# Patient Record
Sex: FEMALE | Race: WHITE
[De-identification: names, ages, dates, MRNs, and addresses within clinical notes are randomized per-mention and may not be internally consistent; named-entity substitution may affect disease eponyms.]

---

## 2017-04-06 ENCOUNTER — HOSPITAL ENCOUNTER (EMERGENCY)
Dept: HOSPITAL 17 - NEPD | Age: 32
Discharge: HOME | End: 2017-04-06
Payer: MEDICAID

## 2017-04-06 VITALS
OXYGEN SATURATION: 98 % | RESPIRATION RATE: 16 BRPM | TEMPERATURE: 98.2 F | DIASTOLIC BLOOD PRESSURE: 58 MMHG | HEART RATE: 72 BPM | SYSTOLIC BLOOD PRESSURE: 108 MMHG

## 2017-04-06 VITALS — RESPIRATION RATE: 16 BRPM

## 2017-04-06 VITALS
RESPIRATION RATE: 18 BRPM | OXYGEN SATURATION: 100 % | SYSTOLIC BLOOD PRESSURE: 107 MMHG | HEART RATE: 64 BPM | DIASTOLIC BLOOD PRESSURE: 60 MMHG

## 2017-04-06 VITALS — HEIGHT: 64 IN | BODY MASS INDEX: 19.95 KG/M2 | WEIGHT: 116.84 LBS

## 2017-04-06 VITALS — OXYGEN SATURATION: 100 %

## 2017-04-06 DIAGNOSIS — K52.9: Primary | ICD-10-CM

## 2017-04-06 DIAGNOSIS — Z72.0: ICD-10-CM

## 2017-04-06 DIAGNOSIS — E87.6: ICD-10-CM

## 2017-04-06 LAB
ALP SERPL-CCNC: 79 U/L (ref 45–117)
ALT SERPL-CCNC: 28 U/L (ref 10–53)
ANION GAP SERPL CALC-SCNC: 6 MEQ/L (ref 5–15)
APTT BLD: 26.1 SEC (ref 24.3–30.1)
AST SERPL-CCNC: 15 U/L (ref 15–37)
BACTERIA #/AREA URNS HPF: (no result) /HPF
BASOPHILS # BLD AUTO: 0 TH/MM3 (ref 0–0.2)
BASOPHILS NFR BLD: 0.4 % (ref 0–2)
BILIRUB SERPL-MCNC: 0.5 MG/DL (ref 0.2–1)
BUN SERPL-MCNC: 5 MG/DL (ref 7–18)
CHLORIDE SERPL-SCNC: 107 MEQ/L (ref 98–107)
COLOR UR: (no result)
COMMENT (UR): (no result)
CULTURE IF INDICATED: (no result)
EOSINOPHIL # BLD: 0 TH/MM3 (ref 0–0.4)
EOSINOPHIL NFR BLD: 0.4 % (ref 0–4)
ERYTHROCYTE [DISTWIDTH] IN BLOOD BY AUTOMATED COUNT: 13.3 % (ref 11.6–17.2)
GFR SERPLBLD BASED ON 1.73 SQ M-ARVRAT: 78 ML/MIN (ref 89–?)
GLUCOSE UR STRIP-MCNC: (no result) MG/DL
HCO3 BLD-SCNC: 29.2 MEQ/L (ref 21–32)
HCT VFR BLD CALC: 40.4 % (ref 35–46)
HEMO FLAGS: (no result)
HGB UR QL STRIP: (no result)
INR PPP: 1 RATIO
KETONES UR STRIP-MCNC: (no result) MG/DL
LYMPHOCYTES # BLD AUTO: 2.3 TH/MM3 (ref 1–4.8)
LYMPHOCYTES NFR BLD AUTO: 23.7 % (ref 9–44)
MCH RBC QN AUTO: 31.5 PG (ref 27–34)
MCHC RBC AUTO-ENTMCNC: 33.8 % (ref 32–36)
MCV RBC AUTO: 93.3 FL (ref 80–100)
MONOCYTES NFR BLD: 5.9 % (ref 0–8)
NEUTROPHILS # BLD AUTO: 6.9 TH/MM3 (ref 1.8–7.7)
NEUTROPHILS NFR BLD AUTO: 69.6 % (ref 16–70)
NITRITE UR QL STRIP: (no result)
PLATELET # BLD: 251 TH/MM3 (ref 150–450)
POTASSIUM SERPL-SCNC: 3.3 MEQ/L (ref 3.5–5.1)
PROTHROMBIN TIME: 11.4 SEC (ref 9.8–11.6)
RBC # BLD AUTO: 4.33 MIL/MM3 (ref 4–5.3)
SODIUM SERPL-SCNC: 142 MEQ/L (ref 136–145)
SP GR UR STRIP: 1 (ref 1–1.03)
SQUAMOUS #/AREA URNS HPF: 1 /HPF (ref 0–5)
WBC # BLD AUTO: 9.9 TH/MM3 (ref 4–11)

## 2017-04-06 PROCEDURE — 87086 URINE CULTURE/COLONY COUNT: CPT

## 2017-04-06 PROCEDURE — 80053 COMPREHEN METABOLIC PANEL: CPT

## 2017-04-06 PROCEDURE — 85025 COMPLETE CBC W/AUTO DIFF WBC: CPT

## 2017-04-06 PROCEDURE — 81001 URINALYSIS AUTO W/SCOPE: CPT

## 2017-04-06 PROCEDURE — 96361 HYDRATE IV INFUSION ADD-ON: CPT

## 2017-04-06 PROCEDURE — 85730 THROMBOPLASTIN TIME PARTIAL: CPT

## 2017-04-06 PROCEDURE — 99284 EMERGENCY DEPT VISIT MOD MDM: CPT

## 2017-04-06 PROCEDURE — 84703 CHORIONIC GONADOTROPIN ASSAY: CPT

## 2017-04-06 PROCEDURE — 96374 THER/PROPH/DIAG INJ IV PUSH: CPT

## 2017-04-06 PROCEDURE — 74177 CT ABD & PELVIS W/CONTRAST: CPT

## 2017-04-06 PROCEDURE — 85610 PROTHROMBIN TIME: CPT

## 2017-04-06 PROCEDURE — 96375 TX/PRO/DX INJ NEW DRUG ADDON: CPT

## 2017-04-06 PROCEDURE — C9113 INJ PANTOPRAZOLE SODIUM, VIA: HCPCS

## 2017-04-06 PROCEDURE — 83690 ASSAY OF LIPASE: CPT

## 2017-04-06 RX ADMIN — Medication PRN ML: at 15:57

## 2017-04-06 RX ADMIN — Medication PRN ML: at 15:45

## 2017-04-06 NOTE — PD
HPI


Chief Complaint:  Flank/Kidney Pain


Time Seen by Provider:  13:15


Travel History


International Travel<30 days:  No


Contact w/Intl Traveler<30days:  No


Traveled to known affect area:  No





History of Present Illness


HPI


31-year-old female complains of left flank pain and low abdominal pain.  

Patient states that the pain started a week ago.  Patient states the pain is 

constant aching pain localized to left flank area and lower abdomen.  Patient 

denies any pain radiation.  Patient states that she was feeling hot and cold 

but no fever at home.  Patient states that she has nausea and poor appetite for 

the past week.  Patient has been drinking fluid.  Patient states that she has 

diarrhea for the past week also.  Patient denies any blood in mucus in stool.  

Patient has history kidney stone in the past.  On a scale of 1-10 the pain is 

an 8.  Patient states that she has irregular menstruation.  Last menstruation 

period was March 15.





PFSH


Past Medical History


Cancer:  No


Cardiovascular Problems:  No


Diabetes:  No


Diminished Hearing:  No


Endocrine:  No


Genitourinary:  No


Hepatitis:  No


Hiatal Hernia:  No


Immune Disorder:  No


Musculoskeletal:  Yes (BROKEN KNEE IN PAST)


Neurologic:  Yes (MIGRAINES )


Psychiatric:  Yes (ANXIETY, ANGER ISSUES)


Reproductive:  Yes (POTENTIAL OVARY CYST)


Respiratory:  No


Immunizations Current:  Yes


Thyroid Disease:  No


Pregnant?:  Not Pregnant


LMP:  3/15/2017





Past Surgical History


Abdominal Surgery:  No


AICD:  No


Cardiac Surgery:  No


Ear Surgery:  No


Endocrine Surgery:  No


Eye Surgery:  No


Genitourinary Surgery:  No


Gynecologic Surgery:  No


Joint Replacement:  No


Oral Surgery:  No


Pacemaker:  No


Thoracic Surgery:  Yes (BREAST REDUCTION)


Other Surgery:  Yes (breast reduction)





Social History


Alcohol Use:  No


Tobacco Use:  Yes


Substance Use:  No





Allergies-Medications


(Allergen,Severity, Reaction):  


Coded Allergies:  


     Zoloft (Verified  Allergy, Severe, SYNCOPE, 4/6/17)


     Percocet (Verified  Allergy, Intermediate, hives, 4/6/17)


Reported Meds & Prescriptions





Reported Meds & Active Scripts


Active


Atorvastatin (Atorvastatin Calcium) 20 Mg Tab 20 Mg PO HS


Reported


Zofran (Ondansetron HCl) 4 Mg Tab 4 Mg PO Q6HR PRN








Review of Systems


General / Constitutional:  No: Fever


Eyes:  No: Visual changes


HENT:  No: Headaches


Cardiovascular:  No: Chest Pain or Discomfort


Respiratory:  No: Shortness of Breath


Gastrointestinal:  Positive: Nausea, Diarrhea, Abdominal Pain


Genitourinary:  No: Dysuria


Musculoskeletal:  No: Pain


Skin:  No Rash


Neurologic:  No: Weakness


Psychiatric:  No: Depression


Endocrine:  No: Polydipsia


Hematologic/Lymphatic:  No: Easy Bruising





Physical Exam


Narrative


GENERAL: Well-nourished, well-developed patient.


SKIN: Focused skin assessment warm/dry.


HEAD: Normocephalic.


EYES: No scleral icterus. No injection or drainage. 


NECK: Supple, trachea midline. No JVD or lymphadenopathy.


CARDIOVASCULAR: Regular rate and rhythm without murmurs, gallops, or rubs. 


RESPIRATORY: Breath sounds equal bilaterally. No accessory muscle use.


GASTROINTESTINAL: Abdomen soft,  nondistended.  Patient has moderate tenderness 

on palpation left upper quadrant and lower abdomen area.  No rebound 

tenderness.  No mass palpable.


MUSCULOSKELETAL: No cyanosis, or edema. 


BACK: Nontender without obvious deformity. No CVA tenderness. 


Neurologic exam normal.





Data


Data


Last Documented VS





Vital Signs








  Date Time  Temp Pulse Resp B/P Pulse Ox O2 Delivery O2 Flow Rate FiO2


 


4/6/17 15:51   16     


 


4/6/17 14:05  64  107/60 100 Room Air  


 


4/6/17 13:10 98.2       








Orders





 Complete Blood Count With Diff (4/6/17 13:30)


Comprehensive Metabolic Panel (4/6/17 13:30)


Lipase (4/6/17 13:30)


Prothrombin Time / Inr (Pt) (4/6/17 13:30)


Act Partial Throm Time (Ptt) (4/6/17 13:30)


Urinalysis - C+S If Indicated (4/6/17 13:30)


Ct Abd/Pel W Iv Contrast(Rout) (4/6/17 13:30)


Iv Access Insert/Monitor (4/6/17 13:30)


Ecg Monitoring (4/6/17 13:30)


Oximetry (4/6/17 13:30)


Sodium Chloride 0.9% Flush (Ns Flush) (4/6/17 13:30)


Ed Urine Pregnancytest Poc (4/6/17 13:30)


Morphine Inj (Morphine Inj) (4/6/17 13:30)


Ondansetron Inj (Zofran Inj) (4/6/17 13:30)


Pantoprazole Inj (Protonix Inj) (4/6/17 13:30)


Sodium Chlor 0.9% 1000 Ml Inj (Ns 1000 M (4/6/17 13:30)


Urine Culture (4/6/17 13:45)


Sodium Chlor 0.9% 1000 Ml Inj (Ns 1000 M (4/6/17 14:45)


Iohexol 350 Inj (Omnipaque 350 Inj) (4/6/17 14:47)





Labs





 Laboratory Tests








Test 4/6/17 4/6/17





 13:43 13:45


 


White Blood Count 9.9 TH/MM3 


 


Red Blood Count 4.33 MIL/MM3 


 


Hemoglobin 13.7 GM/DL 


 


Hematocrit 40.4 % 


 


Mean Corpuscular Volume 93.3 FL 


 


Mean Corpuscular Hemoglobin 31.5 PG 


 


Mean Corpuscular Hemoglobin 33.8 % 





Concent  


 


Red Cell Distribution Width 13.3 % 


 


Platelet Count 251 TH/MM3 


 


Mean Platelet Volume 9.5 FL 


 


Neutrophils (%) (Auto) 69.6 % 


 


Lymphocytes (%) (Auto) 23.7 % 


 


Monocytes (%) (Auto) 5.9 % 


 


Eosinophils (%) (Auto) 0.4 % 


 


Basophils (%) (Auto) 0.4 % 


 


Neutrophils # (Auto) 6.9 TH/MM3 


 


Lymphocytes # (Auto) 2.3 TH/MM3 


 


Monocytes # (Auto) 0.6 TH/MM3 


 


Eosinophils # (Auto) 0.0 TH/MM3 


 


Basophils # (Auto) 0.0 TH/MM3 


 


CBC Comment DIFF FINAL  


 


Differential Comment   


 


Prothrombin Time 11.4 SEC 


 


Prothromb Time International 1.0 RATIO 





Ratio  


 


Activated Partial 26.1 SEC 





Thromboplast Time  


 


Sodium Level 142 MEQ/L 


 


Potassium Level 3.3 MEQ/L 


 


Chloride Level 107 MEQ/L 


 


Carbon Dioxide Level 29.2 MEQ/L 


 


Anion Gap 6 MEQ/L 


 


Blood Urea Nitrogen 5 MG/DL 


 


Creatinine 0.85 MG/DL 


 


Estimat Glomerular Filtration 78 ML/MIN 





Rate  


 


Random Glucose 91 MG/DL 


 


Calcium Level 9.5 MG/DL 


 


Total Bilirubin 0.5 MG/DL 


 


Aspartate Amino Transf 15 U/L 





(AST/SGOT)  


 


Alanine Aminotransferase 28 U/L 





(ALT/SGPT)  


 


Alkaline Phosphatase 79 U/L 


 


Total Protein 9.1 GM/DL 


 


Albumin 5.1 GM/DL 


 


Lipase 168 U/L 


 


Urine Color  LIGHT-YELLOW 


 


Urine Turbidity  CLEAR 


 


Urine pH  7.0 


 


Urine Specific Gravity  1.003 


 


Urine Protein  NEG mg/dL


 


Urine Glucose (UA)  NEG mg/dL


 


Urine Ketones  NEG mg/dL


 


Urine Occult Blood  TRACE 


 


Urine Nitrite  NEG 


 


Urine Bilirubin  NEG 


 


Urine Urobilinogen  LESS THAN 2.0





  MG/DL


 


Urine Leukocyte Esterase  NEG 


 


Urine RBC  LESS THAN 1





  /hpf


 


Urine WBC  LESS THAN 1





  /hpf


 


Urine Squamous Epithelial  1 /hpf





Cells  


 


Urine Bacteria  MOD /hpf


 


Microscopic Urinalysis Comment  CULTURE





  INDICATED











MDM


Medical Decision Making


Medical Screen Exam Complete:  Yes


Emergency Medical Condition:  Yes


Interpretation(s)


1450 p.m.  CBC within normal limit.  Potassium 3.3.  CMP otherwise within 

normal limit.  UA positive for bacteria.  Negative WBC or RBC.


1625 PM.  CT scan abdomen and pelvis shows bilateral ovarian cysts.  Small 

amount of free fluid in the pelvis.


Differential Diagnosis


Differential diagnosis including gastroenteritis, gastritis, PUD, pancreatitis, 

cholecystitis, colitis, UTI, pyelonephritis, nephrolithiasis.


Narrative Course


31-year-old female complains of left flank pain and low abdominal pain.  Normal 

saline solution 1 L IV bolus.  Zofran 4 mg IV.  Morphine 2 mg IV.  Protonix 40 

mg IV.





Diagnosis





 Primary Impression:  


 Abdominal pain


 Qualified Code:  R10.30 - Lower abdominal pain


 Additional Impressions:  


 Gastroenteritis


 Hypokalemia


Patient Instructions:  General Instructions





***Additional Instructions:


Clear fluids for 24 hours and advance as tolerated.  Take medications as 

needed.  Follow-up with personal physician.  Return if persistent problem or 

worse.


***Med/Other Pt SpecificInfo:  Prescription(s) given


Scripts


Tramadol (Ultram)50 Mg Tab50 Mg PO Q6H PRN (PAIN) #20 TAB  Ref 0


   Prov:Jevon Calero MD         4/6/17 


Dicyclomine (Bentyl)20 Mg Tab20 Mg PO TID  #15 TAB  Ref 0


   Prov:Jevon Calero MD         4/6/17 


Ondansetron Odt (Zofran Odt)4 Mg Tab4 Mg SL Q6HR PRN (Nausea/Vomiting) #10 TAB  

Ref 0


   Prov:Jevon Calero MD         4/6/17 


Potassium Chloride ER (Potassium Chloride CR)10 Meq Tab10 Meq PO DAILY  #6 TAB


   Prov:Jevon Calero MD         4/6/17


Disposition:  01 DISCHARGE HOME


Condition:  Stable








Jevon Calero MD Apr 6, 2017 13:39

## 2017-04-06 NOTE — RADRPT
EXAM DATE/TIME:  04/06/2017 14:31 

 

[facility] COMPARISON:     

No previous studies available for comparison.

 

 

INDICATIONS :     

Left flank and lower abdominal pain. 

                      

 

IV CONTRAST:     

90 cc Omnipaque 350 (iohexol) IV 

 

 

ORAL CONTRAST:      

No oral contrast ingested.

                      

 

RADIATION DOSE:     

9.96 CTDIvol (mGy) 

 

 

MEDICAL HISTORY :     

Renal calculi.  

 

SURGICAL HISTORY :      

None. 

 

ENCOUNTER:      

Initial

 

ACUITY:      

1 week

 

PAIN SCALE:      

5/10

 

LOCATION:       

Left flank 

 

TECHNIQUE:     

Volumetric scanning of the abdomen and pelvis was performed.  Using automated exposure control and ad
justment of the mA and/or kV according to patient size, radiation dose was kept as low as reasonably 
achievable to obtain optimal diagnostic quality images. 

 

FINDINGS:     

 

LOWER LUNGS:     

The visualized lower lungs are clear.

 

LIVER:     

Homogeneous density without lesion.  There is no dilation of the biliary tree.  No calcified gallston
es.

 

SPLEEN:     

Normal size without lesion.

 

PANCREAS:     

Within normal limits.

 

KIDNEYS:     

Normal in size and shape.  There is no mass, stone or hydronephrosis.  Smooth margin cyst in lower po
le left kidney measuring 1 cm. 

 

ADRENAL GLANDS:     

Within normal limits.

 

VASCULAR:     

There is no aortic aneurysm.

 

BOWEL/MESENTERY:     

No dilated loops of small or large bowel.

 

ABDOMINAL WALL:     

Within normal limits.

 

RETROPERITONEUM:     

There is no lymphadenopathy. 

 

BLADDER:     

No wall thickening or mass. 

 

REPRODUCTIVE:     

Prominent low density areas in the adnexa bilaterally suggesting bilateral ovarian cysts.  There is a
 mild amount of free fluid in the cul-de-sac which measures up to 1.6 cm in thickness.  The uterus is
 anteverted.  Prominent bilateral adnexal vessels.

 

INGUINAL:     

There is no lymphadenopathy or hernia. 

 

MUSCULOSKELETAL:     

Within normal limits for patient age. 

 

CONCLUSION:     

1. Probable bilateral ovarian cysts.  There is a mild amount of free fluid in the pelvis.

2. No evidence of hydronephrosis

 

 

 

 Christopher Beckman MD on April 06, 2017 at 16:07           

Board Certified Radiologist.

 This report was verified electronically.

## 2018-05-02 ENCOUNTER — HOSPITAL ENCOUNTER (OUTPATIENT)
Dept: HOSPITAL 17 - HSDC | Age: 33
Discharge: HOME | End: 2018-05-02
Payer: MEDICAID

## 2018-05-02 VITALS
TEMPERATURE: 97.7 F | SYSTOLIC BLOOD PRESSURE: 96 MMHG | DIASTOLIC BLOOD PRESSURE: 52 MMHG | HEART RATE: 63 BPM | OXYGEN SATURATION: 100 % | RESPIRATION RATE: 18 BRPM

## 2018-05-02 VITALS — WEIGHT: 118.61 LBS | HEIGHT: 64 IN | BODY MASS INDEX: 20.25 KG/M2

## 2018-05-02 DIAGNOSIS — N20.1: Primary | ICD-10-CM

## 2018-05-02 LAB
BASOPHILS # BLD AUTO: 0 TH/MM3 (ref 0–0.2)
BASOPHILS NFR BLD: 0.4 % (ref 0–2)
EOSINOPHIL # BLD: 0.2 TH/MM3 (ref 0–0.4)
EOSINOPHIL NFR BLD: 2.3 % (ref 0–4)
ERYTHROCYTE [DISTWIDTH] IN BLOOD BY AUTOMATED COUNT: 12.9 % (ref 11.6–17.2)
HCT VFR BLD CALC: 39 % (ref 35–46)
HGB BLD-MCNC: 13.5 GM/DL (ref 11.6–15.3)
LYMPHOCYTES # BLD AUTO: 2.9 TH/MM3 (ref 1–4.8)
LYMPHOCYTES NFR BLD AUTO: 29.3 % (ref 9–44)
MCH RBC QN AUTO: 32.2 PG (ref 27–34)
MCHC RBC AUTO-ENTMCNC: 34.6 % (ref 32–36)
MCV RBC AUTO: 93 FL (ref 80–100)
MONOCYTE #: 0.7 TH/MM3 (ref 0–0.9)
MONOCYTES NFR BLD: 6.6 % (ref 0–8)
NEUTROPHILS # BLD AUTO: 6.2 TH/MM3 (ref 1.8–7.7)
NEUTROPHILS NFR BLD AUTO: 61.4 % (ref 16–70)
PLATELET # BLD: 269 TH/MM3 (ref 150–450)
PMV BLD AUTO: 9.5 FL (ref 7–11)
RBC # BLD AUTO: 4.2 MIL/MM3 (ref 4–5.3)
WBC # BLD AUTO: 10 TH/MM3 (ref 4–11)

## 2018-05-02 PROCEDURE — 85025 COMPLETE CBC W/AUTO DIFF WBC: CPT

## 2018-05-02 PROCEDURE — 50590 FRAGMENTING OF KIDNEY STONE: CPT

## 2018-05-02 PROCEDURE — 00873 ANES LITHOTRP ESW WO WTR BTH: CPT

## 2018-05-02 PROCEDURE — 74018 RADEX ABDOMEN 1 VIEW: CPT

## 2018-05-02 PROCEDURE — 84702 CHORIONIC GONADOTROPIN TEST: CPT

## 2018-05-02 NOTE — PD.OP
__________________________________________________





Operative Report


Date of Surgery:  May 2, 2018


Preoperative Diagnosis:  


Right ureteral stones


Postoperative Diagnosis:  


Same


Procedure:


Right extracorporeal shockwave lithotripsy


Anesthesia:


MAC


Surgeon:


Leonardo White


Assistant(s):


None


Resident Surgeon:


None


Operation and Findings:


32-year-old female presented with history of ureteral calculi.  Patient was 

found to have a 4 and 6 mm mid ureteral stone who had a stent placement by Dr. Jessica in the past.  Patient elected to undergo right extrapleural shockwave 

lithotripsy.  Risk and benefits were discussed preoperatively she is willing to 

proceed.  Patient was brought to the operating room and identified by myself is 

Codi Cardenas.  She is placed on the operating table in supine position.  She 

received preprocedure antibiotics and MAC anesthesia was a .  Under 

fluoroscopic imaging guidance the stones were visualized in the mid ureter 

along the stent.  ESWL wall therapy commenced and the patient received a total 

of 3000 shocks with good fragmentation of the stone visualized under 

fluoroscopic imaging.  She tolerated the procedure well.  She was awoken and 

transferred recovery room in stable condition.  She will follow-up in the 

office in 2 weeks and obtain a KUB x-ray prior to her appointment.











Leonardo White DO May 2, 2018 08:35